# Patient Record
Sex: MALE | Race: WHITE | HISPANIC OR LATINO | Employment: STUDENT | URBAN - METROPOLITAN AREA
[De-identification: names, ages, dates, MRNs, and addresses within clinical notes are randomized per-mention and may not be internally consistent; named-entity substitution may affect disease eponyms.]

---

## 2021-09-23 ENCOUNTER — OFFICE VISIT (OUTPATIENT)
Dept: FAMILY MEDICINE CLINIC | Facility: CLINIC | Age: 21
End: 2021-09-23

## 2021-09-23 ENCOUNTER — LAB (OUTPATIENT)
Dept: LAB | Facility: HOSPITAL | Age: 21
End: 2021-09-23

## 2021-09-23 VITALS
TEMPERATURE: 96.9 F | SYSTOLIC BLOOD PRESSURE: 136 MMHG | BODY MASS INDEX: 28.36 KG/M2 | HEART RATE: 83 BPM | DIASTOLIC BLOOD PRESSURE: 68 MMHG | WEIGHT: 209.4 LBS | OXYGEN SATURATION: 96 % | RESPIRATION RATE: 16 BRPM | HEIGHT: 72 IN

## 2021-09-23 DIAGNOSIS — F41.9 ANXIETY: ICD-10-CM

## 2021-09-23 DIAGNOSIS — D72.829 LEUKOCYTOSIS, UNSPECIFIED TYPE: ICD-10-CM

## 2021-09-23 DIAGNOSIS — R07.89 OTHER CHEST PAIN: Primary | ICD-10-CM

## 2021-09-23 LAB
ALBUMIN SERPL-MCNC: 4.9 G/DL (ref 3.5–5.2)
ALBUMIN/GLOB SERPL: 1.4 G/DL
ALP SERPL-CCNC: 76 U/L (ref 39–117)
ALT SERPL W P-5'-P-CCNC: 11 U/L (ref 1–41)
ANION GAP SERPL CALCULATED.3IONS-SCNC: 10.4 MMOL/L (ref 5–15)
AST SERPL-CCNC: 20 U/L (ref 1–40)
BASOPHILS # BLD AUTO: 0.07 10*3/MM3 (ref 0–0.2)
BASOPHILS NFR BLD AUTO: 0.8 % (ref 0–1.5)
BILIRUB SERPL-MCNC: 1.3 MG/DL (ref 0–1.2)
BUN SERPL-MCNC: 11 MG/DL (ref 6–20)
BUN/CREAT SERPL: 10.4 (ref 7–25)
CALCIUM SPEC-SCNC: 10 MG/DL (ref 8.6–10.5)
CHLORIDE SERPL-SCNC: 102 MMOL/L (ref 98–107)
CO2 SERPL-SCNC: 28.6 MMOL/L (ref 22–29)
CREAT SERPL-MCNC: 1.06 MG/DL (ref 0.76–1.27)
DEPRECATED RDW RBC AUTO: 37.5 FL (ref 37–54)
EOSINOPHIL # BLD AUTO: 0.09 10*3/MM3 (ref 0–0.4)
EOSINOPHIL NFR BLD AUTO: 1 % (ref 0.3–6.2)
ERYTHROCYTE [DISTWIDTH] IN BLOOD BY AUTOMATED COUNT: 11.7 % (ref 12.3–15.4)
GFR SERPL CREATININE-BSD FRML MDRD: 89 ML/MIN/1.73
GLOBULIN UR ELPH-MCNC: 3.4 GM/DL
GLUCOSE SERPL-MCNC: 81 MG/DL (ref 65–99)
HCT VFR BLD AUTO: 45.8 % (ref 37.5–51)
HGB BLD-MCNC: 14.9 G/DL (ref 13–17.7)
IMM GRANULOCYTES # BLD AUTO: 0.03 10*3/MM3 (ref 0–0.05)
IMM GRANULOCYTES NFR BLD AUTO: 0.3 % (ref 0–0.5)
LYMPHOCYTES # BLD AUTO: 2.7 10*3/MM3 (ref 0.7–3.1)
LYMPHOCYTES NFR BLD AUTO: 30.3 % (ref 19.6–45.3)
MCH RBC QN AUTO: 28.4 PG (ref 26.6–33)
MCHC RBC AUTO-ENTMCNC: 32.5 G/DL (ref 31.5–35.7)
MCV RBC AUTO: 87.2 FL (ref 79–97)
MONOCYTES # BLD AUTO: 0.85 10*3/MM3 (ref 0.1–0.9)
MONOCYTES NFR BLD AUTO: 9.5 % (ref 5–12)
NEUTROPHILS NFR BLD AUTO: 5.17 10*3/MM3 (ref 1.7–7)
NEUTROPHILS NFR BLD AUTO: 58.1 % (ref 42.7–76)
NRBC BLD AUTO-RTO: 0 /100 WBC (ref 0–0.2)
PLATELET # BLD AUTO: 358 10*3/MM3 (ref 140–450)
PMV BLD AUTO: 10.5 FL (ref 6–12)
POTASSIUM SERPL-SCNC: 4.3 MMOL/L (ref 3.5–5.2)
PROT SERPL-MCNC: 8.3 G/DL (ref 6–8.5)
RBC # BLD AUTO: 5.25 10*6/MM3 (ref 4.14–5.8)
SODIUM SERPL-SCNC: 141 MMOL/L (ref 136–145)
WBC # BLD AUTO: 8.91 10*3/MM3 (ref 3.4–10.8)

## 2021-09-23 PROCEDURE — 99204 OFFICE O/P NEW MOD 45 MIN: CPT | Performed by: FAMILY MEDICINE

## 2021-09-23 PROCEDURE — 80053 COMPREHEN METABOLIC PANEL: CPT

## 2021-09-23 PROCEDURE — 85025 COMPLETE CBC W/AUTO DIFF WBC: CPT

## 2021-09-23 RX ORDER — ALBUTEROL SULFATE 90 UG/1
2 AEROSOL, METERED RESPIRATORY (INHALATION) DAILY PRN
COMMUNITY
Start: 2021-08-16 | End: 2022-08-16

## 2021-09-23 RX ORDER — BUSPIRONE HYDROCHLORIDE 10 MG/1
10 TABLET ORAL 3 TIMES DAILY PRN
Qty: 90 TABLET | Refills: 0 | Status: SHIPPED | OUTPATIENT
Start: 2021-09-23 | End: 2021-11-05

## 2021-09-23 RX ORDER — HYDROXYZINE PAMOATE 25 MG/1
25 CAPSULE ORAL EVERY 6 HOURS PRN
COMMUNITY
Start: 2021-09-21 | End: 2021-11-05

## 2021-09-23 RX ORDER — ESCITALOPRAM OXALATE 10 MG/1
10 TABLET ORAL DAILY
Qty: 30 TABLET | Refills: 2 | Status: SHIPPED | OUTPATIENT
Start: 2021-09-23 | End: 2021-11-05 | Stop reason: SDUPTHER

## 2021-09-23 NOTE — PROGRESS NOTES
"Subjective   Georges Lentz is a 20 y.o. male.     Chief Complaint   Patient presents with   • Establish Care   • Chest Pain     going on for 1-1/2 months       History of Present Illness     Acute complaints:  Georges Lentz is a 20 y.o. male who presents today to [facility]. He has been seen at the urgent care, and the ER over the last month for the same. On 09/02/2021, he was seen here at [facility] by MARICARMEN Núñez, for intermittent chest pain. He evidentially had a normal EKG not too long prior to that. He was noted to have a slight grade 2 diastolic heart murmur at that visit. He was subsequently seen on 09/21/2021 in the ER at  for palpitations x1 day. Troponin, and TSH were unremarkable. CMP showed a slight anion gap, and total protein elevation as well as a very mild leukocytosis to 10.97 without a left shift. A chest x-ray was unremarkable. EKG showed sinus tachycardia with possible right atrial enlargement. Computer read stated cannot rule out anteroinferior infarct. Given the duration of his pain for the last 1.5 months, this was thought to be not clinically relevant.      He reports that at the urgent care he was told he had a heart murmur. He went to the emergency room, and they did not hear a heart murmur. He reports that that day he felt like he was having palpitations, and his heart rate was going up faster when he was going up steep inclines. He also had \"weird\" leg pain as well as leg pain that radiated up to his thighs that occurs only when he is active. He believes it may have been anxiety because he had a test that day, and his chest was hurting more in the morning due to the anxiety. His heart rate, and leg pain persisted, and he felt severely fatigued. He went to the emergency room, where they administered tests, and told him he was fine.       He describes the chest pain as sharp, and just \"pops up.\" The chest pain is intermittent, and will worsen when he is anxious, but " "even occurs at rest. He reports feeling a pressure randomly in his side that would persist for 30 minutes to 1 hour, and then resolve. He adds that he has not felt this symptom for a while. He reports taking a deep breath occasionally will cause the same sharp pain in the same area. The pain would radiate down his left arm as well, but It does not stop him from doing anything. He has taken hydroxyzine, which has helped with the chest pain, and his anxiety. He adds that he takes it in the morning. He states that he was feeling some pain yesterday, but today has not felt much pain at all. He denies ever being diagnosed with anxiety, but he has had anxiety since going to school. He has seen a therapist before, and has a therapy appointment next week with  [facility].     He does not smoke cigarettes. He smokes marijuana every day, and is unsure if he will be smoking it anymore, but he was smoking marijuana at the time that the chest pain started. He lived in [place], and moved to the area at the end of 08/2021, and reports that he was having chest pain while he still lived in [place]. He has not had any testing in [place] for these symptoms. He believes the chest pain worsened at first when he moved here.       He is going to school for economics with a minor in statistics.    He reports that the anxiety stops him from doing a lot of things because he pushes things back and then they \"build up,\" and makes his anxiety worse. He adds that he paces often, and ruminates about things he is worried about. He has never been on medication for anxiety, and reports that he has felt the best he has felt in over 1 year being on the hydroxyzine.         This patient is accompanied by their self who contributes to the history of their care.    The following portions of the patient's history were reviewed and updated as appropriate: allergies, current medications, past family history, past medical history, past social " "history, past surgical history and problem list.    Active Ambulatory Problems     Diagnosis Date Noted   • No Active Ambulatory Problems     Resolved Ambulatory Problems     Diagnosis Date Noted   • No Resolved Ambulatory Problems     No Additional Past Medical History     Past Surgical History:   Procedure Laterality Date   • WISDOM TOOTH EXTRACTION       Family History   Family history unknown: Yes     Social History     Socioeconomic History   • Marital status: Single     Spouse name: Not on file   • Number of children: Not on file   • Years of education: Not on file   • Highest education level: Not on file   Tobacco Use   • Smoking status: Never Smoker   • Smokeless tobacco: Never Used   Vaping Use   • Vaping Use: Former   • Substances: THC   • Passive vaping exposure Yes   Substance and Sexual Activity   • Alcohol use: Yes     Comment: occ   • Drug use: Not Currently     Types: Marijuana   • Sexual activity: Defer       Review of Systems  See HPI and new patient paperwork scanned into chart    Objective   Blood pressure 136/68, pulse 83, temperature 96.9 °F (36.1 °C), resp. rate 16, height 182.9 cm (72\"), weight 95 kg (209 lb 6.4 oz), SpO2 96 %.  Nursing note reviewed  Physical Exam  Const: NAD, A&Ox4, Pleasant, Cooperative  Eyes: EOMI, no conjunctivitis  ENT: No nasal discharge present, neck supple  Cardiac: Regular rate and rhythm, no cyanosis  Resp: Respiratory rate within normal limits, no increased work of breathing, no audible wheezing or retractions noted  GI: No distention or ascites  MSK: Motor and sensation grossly intact in bilateral upper extremities  Neurologic: CN II-XII grossly intact  Psych: Appropriate mood and behavior.  Skin: Warm, dry  Procedures  Assessment/Plan   Problem List Items Addressed This Visit     None      Visit Diagnoses     Other chest pain    -  Primary    Anxiety        Relevant Medications    busPIRone (BUSPAR) 10 MG tablet    escitalopram (Lexapro) 10 MG tablet    " Leukocytosis, unspecified type        Relevant Orders    Comprehensive Metabolic Panel    CBC & Differential      1. Noncardiac chest pain   - His symptoms are very consistent with somatic manifestations of anxiety. He has improved with treatment already with hydroxyzine. He would benefit from a daily anxiety medication. I will send in Lexapro 10 mg daily. I counseled the patient on possible side effects of this medication as well as expectations for results. I would also like him to start taking buspirone as needed. This may replace hydroxyzine if the hydroxyzine is limited by side effects for him.     He will have blood work today, and I will call him with the results.    We will plan to obtain previous records both for chronic preventative care as well as those related to the current episode of care.  Any records that the patient brought with him today were reviewed personally by me during the visit today and will be scanned into the chart for posterity.    Discussed the nature of the disease including relevant anatomy & expected clinical course, risks, complications, implications, management, safe and proper use of medications. Encouraged therapeutic lifestyle changes including low calorie diet with plenty of fruits and vegetables, daily exercise, medication compliance, and keeping scheduled follow up appointments with me and any other providers. Encouraged patient to have appointment for complete physical, fasting labs, appropriate screenings, and immunizations on an annual basis. Discussed extended office hours, shared call, and appropriate use of the ER. Discussed generally we do not prescribe chronic controlled substances from this office. Appropriate referrals will be made to pain management and psychiatry if needed. Stressed the importance and expectation of medical compliance with plan of care, medications, and follow up appointments.    There are no Patient Instructions on file for this visit.    Return  in about 6 weeks (around 11/4/2021) for video visit.    Ambulatory progress note signed and attested to by Gustavo Duarte D.O.         Transcribed from ambient dictation for Gustavo Duarte DO by Audra Fernandes.  09/23/21   10:11 EDT    I have personally performed the services described in this document as transcribed by the above individual, and it is both accurate and complete.  Gustavo Duarte DO  9/23/2021  11:04 EDT

## 2021-11-05 ENCOUNTER — TELEMEDICINE (OUTPATIENT)
Dept: FAMILY MEDICINE CLINIC | Facility: CLINIC | Age: 21
End: 2021-11-05

## 2021-11-05 DIAGNOSIS — F41.9 ANXIETY: Primary | ICD-10-CM

## 2021-11-05 PROCEDURE — 99213 OFFICE O/P EST LOW 20 MIN: CPT | Performed by: FAMILY MEDICINE

## 2021-11-05 RX ORDER — ESCITALOPRAM OXALATE 10 MG/1
10 TABLET ORAL DAILY
Qty: 90 TABLET | Refills: 3 | Status: SHIPPED | OUTPATIENT
Start: 2021-11-05 | End: 2022-06-21 | Stop reason: SDUPTHER

## 2021-11-05 NOTE — PROGRESS NOTES
"Subjective   Georges Lentz is a 20 y.o. male.     Chief Complaint   Patient presents with   • Follow-up     anxiety       History of Present Illness     Georges Lentz presents today for   Chief Complaint   Patient presents with   • Follow-up     anxiety     He is following up on anxiety. At his initial visit he was experiencing some severe somatic manifestations of anxiety, but he states that since starting the lexapro 6 weeks ago, starting a couple weeks after that he has done extremnely well. He \"definitely doesn't want to go back to how [he] was feeling before.\" He has not experienced any side effects. He has not needed the buspirone or vistaril in about 3 weeks. He is very pleased with his response to the escitalopram so far and desires to continue the medication.    You have chosen to receive care through a telehealth visit.  Do you consent to use a video/audio connection for your medical care today? Yes    The following portions of the patient's history were reviewed and updated as appropriate: allergies, current medications, past family history, past medical history, past social history, past surgical history and problem list.    Active Ambulatory Problems     Diagnosis Date Noted   • Anxiety 11/05/2021     Resolved Ambulatory Problems     Diagnosis Date Noted   • No Resolved Ambulatory Problems     No Additional Past Medical History     Past Surgical History:   Procedure Laterality Date   • WISDOM TOOTH EXTRACTION       Family History   Family history unknown: Yes     Social History     Socioeconomic History   • Marital status: Single   Tobacco Use   • Smoking status: Never Smoker   • Smokeless tobacco: Never Used   Vaping Use   • Vaping Use: Former   • Substances: THC   • Passive vaping exposure: Yes   Substance and Sexual Activity   • Alcohol use: Yes     Comment: occ   • Drug use: Not Currently     Types: Marijuana   • Sexual activity: Defer       Review of Systems  Review of Systems -  General ROS: " negative for - chills, fever or night sweats  Cardiovascular ROS: no chest pain or dyspnea on exertion  Gastrointestinal ROS: no abdominal pain, change in bowel habits, or black or bloody stools  Genito-Urinary ROS: no dysuria, trouble voiding, or hematuria    Objective   There were no vitals taken for this visit.  Vitals obtained from patient if available  Physical Exam  Const: Non-toxic appearing, NAD, A&Ox4, Pleasant, Cooperative  Eyes: EOMI, no conjunctivitis  ENT: No copious nasal drainage noted  Cardiac: Regular rate by pulse  Resp: Respiratory rate observed to be within normal limits, no increased work of breathing observed, no audible wheezing or cough noted  Psych: Appropriate mood and behavior.  Procedures  Assessment/Plan   Problem List Items Addressed This Visit        Mental Health    Anxiety - Primary    Current Assessment & Plan     Responded extremely well to escitalopram 10mg daily. All of his symptoms particularly the somatic manifestations of his anxiety (chest pain, etc) are gone. He is pleased with the med and would like to continue. Refill sent today.         Relevant Medications    escitalopram (Lexapro) 10 MG tablet          See patient diagnoses and orders along with patient instructions for assessment, plan, and changes to care for patient.    This visit was conducted via telemedicine with live video and audio provided through Video Options: MyChart/Zoom at the point of care.    Patient Instructions   1.  Continue lexapro 10mg daily      Return in about 1 year (around 11/5/2022) for Annual.    Ambulatory progress note signed and attested to by Gustavo Duarte D.O.

## 2021-11-05 NOTE — ASSESSMENT & PLAN NOTE
Responded extremely well to escitalopram 10mg daily. All of his symptoms particularly the somatic manifestations of his anxiety (chest pain, etc) are gone. He is pleased with the med and would like to continue. Refill sent today.

## 2022-06-21 DIAGNOSIS — F41.9 ANXIETY: ICD-10-CM

## 2022-06-22 NOTE — TELEPHONE ENCOUNTER
Rx Refill Note  Requested Prescriptions     Pending Prescriptions Disp Refills   • escitalopram (Lexapro) 10 MG tablet 90 tablet 3     Sig: Take 1 tablet by mouth Daily.      Last office visit with prescribing clinician: 11/5/2021      Next office visit with prescribing clinician: Visit date not found            Richard Lee MA  06/22/22, 13:27 EDT     Patient requesting refill to [facility] in [place], [place] need to see if he is visiting or has moved there.    OK FOR HUB TO RELAY MESSAGE AND DOCUMENT

## 2022-06-23 RX ORDER — ESCITALOPRAM OXALATE 10 MG/1
10 TABLET ORAL DAILY
Qty: 90 TABLET | Refills: 1 | Status: SHIPPED | OUTPATIENT
Start: 2022-06-23 | End: 2022-07-21 | Stop reason: SDUPTHER

## 2022-07-21 DIAGNOSIS — F41.9 ANXIETY: ICD-10-CM

## 2022-07-21 RX ORDER — ESCITALOPRAM OXALATE 10 MG/1
10 TABLET ORAL DAILY
Qty: 90 TABLET | Refills: 1 | Status: SHIPPED | OUTPATIENT
Start: 2022-07-21 | End: 2023-03-08 | Stop reason: SDUPTHER

## 2022-07-21 NOTE — TELEPHONE ENCOUNTER
Rx Refill Note  Requested Prescriptions     Pending Prescriptions Disp Refills   • escitalopram (Lexapro) 10 MG tablet 90 tablet 1     Sig: Take 1 tablet by mouth Daily.      Last office visit with prescribing clinician: 9/23/2021      Next office visit with prescribing clinician: Visit date not found            Ashley Magallon MA  07/21/22, 16:47 EDT

## 2023-03-08 ENCOUNTER — LAB (OUTPATIENT)
Dept: LAB | Facility: HOSPITAL | Age: 23
End: 2023-03-08

## 2023-03-08 ENCOUNTER — OFFICE VISIT (OUTPATIENT)
Dept: FAMILY MEDICINE CLINIC | Facility: CLINIC | Age: 23
End: 2023-03-08

## 2023-03-08 VITALS
WEIGHT: 285 LBS | HEIGHT: 72 IN | SYSTOLIC BLOOD PRESSURE: 130 MMHG | HEART RATE: 76 BPM | DIASTOLIC BLOOD PRESSURE: 70 MMHG | BODY MASS INDEX: 38.6 KG/M2 | OXYGEN SATURATION: 98 %

## 2023-03-08 DIAGNOSIS — Z13.220 SCREENING FOR HYPERLIPIDEMIA: ICD-10-CM

## 2023-03-08 DIAGNOSIS — E66.09 CLASS 2 OBESITY DUE TO EXCESS CALORIES WITHOUT SERIOUS COMORBIDITY WITH BODY MASS INDEX (BMI) OF 38.0 TO 38.9 IN ADULT: ICD-10-CM

## 2023-03-08 DIAGNOSIS — Z13.29 SCREENING FOR ENDOCRINE DISORDER: ICD-10-CM

## 2023-03-08 DIAGNOSIS — F41.9 ANXIETY: Primary | ICD-10-CM

## 2023-03-08 LAB
ALBUMIN SERPL-MCNC: 4.6 G/DL (ref 3.5–5.2)
ALBUMIN/GLOB SERPL: 1.4 G/DL
ALP SERPL-CCNC: 104 U/L (ref 39–117)
ALT SERPL W P-5'-P-CCNC: 46 U/L (ref 1–41)
ANION GAP SERPL CALCULATED.3IONS-SCNC: 10.6 MMOL/L (ref 5–15)
AST SERPL-CCNC: 29 U/L (ref 1–40)
BILIRUB SERPL-MCNC: 0.5 MG/DL (ref 0–1.2)
BUN SERPL-MCNC: 15 MG/DL (ref 6–20)
BUN/CREAT SERPL: 16.1 (ref 7–25)
CALCIUM SPEC-SCNC: 9.5 MG/DL (ref 8.6–10.5)
CHLORIDE SERPL-SCNC: 102 MMOL/L (ref 98–107)
CHOLEST SERPL-MCNC: 194 MG/DL (ref 0–200)
CO2 SERPL-SCNC: 24.4 MMOL/L (ref 22–29)
CREAT SERPL-MCNC: 0.93 MG/DL (ref 0.76–1.27)
EGFRCR SERPLBLD CKD-EPI 2021: 119.1 ML/MIN/1.73
GLOBULIN UR ELPH-MCNC: 3.2 GM/DL
GLUCOSE SERPL-MCNC: 93 MG/DL (ref 65–99)
HBA1C MFR BLD: 4.8 % (ref 4.8–5.6)
HDLC SERPL-MCNC: 53 MG/DL (ref 40–60)
LDLC SERPL CALC-MCNC: 123 MG/DL (ref 0–100)
LDLC/HDLC SERPL: 2.28 {RATIO}
POTASSIUM SERPL-SCNC: 4.3 MMOL/L (ref 3.5–5.2)
PROT SERPL-MCNC: 7.8 G/DL (ref 6–8.5)
SODIUM SERPL-SCNC: 137 MMOL/L (ref 136–145)
TRIGL SERPL-MCNC: 100 MG/DL (ref 0–150)
TSH SERPL DL<=0.05 MIU/L-ACNC: 3.48 UIU/ML (ref 0.27–4.2)
VLDLC SERPL-MCNC: 18 MG/DL (ref 5–40)

## 2023-03-08 PROCEDURE — 80053 COMPREHEN METABOLIC PANEL: CPT | Performed by: FAMILY MEDICINE

## 2023-03-08 PROCEDURE — 99213 OFFICE O/P EST LOW 20 MIN: CPT | Performed by: FAMILY MEDICINE

## 2023-03-08 PROCEDURE — 84443 ASSAY THYROID STIM HORMONE: CPT | Performed by: FAMILY MEDICINE

## 2023-03-08 PROCEDURE — 83036 HEMOGLOBIN GLYCOSYLATED A1C: CPT | Performed by: FAMILY MEDICINE

## 2023-03-08 PROCEDURE — 80061 LIPID PANEL: CPT | Performed by: FAMILY MEDICINE

## 2023-03-08 RX ORDER — ESCITALOPRAM OXALATE 10 MG/1
10 TABLET ORAL DAILY
Qty: 90 TABLET | Refills: 3 | Status: SHIPPED | OUTPATIENT
Start: 2023-03-08

## 2023-03-08 NOTE — ASSESSMENT & PLAN NOTE
He responded extremely well to escitalopram over the last year and a half.  His anxiety symptoms, in particular the somatic manifestations of his anxiety, have resolved entirely since being on this medication.

## 2023-03-08 NOTE — PATIENT INSTRUCTIONS
Blood work has been ordered for you today. You do not need an appointment.  If you are unable to have your labs done today, please return over the next few days to have them done.  The nurse will call you with results (or they will become available via [employer]) or they will be discussed at your next scheduled visit.  -Lab services are available at any [facility] outpatient lab center, including across the street at [address] [place] [address]  -Note: As of January 1, 2021, all lab results are automatically released to [employer] immediately when they return whether they have been reviewed or not. You may see your results show up on your MyChart without any context or comment. Please allow 1-2 business days after the labs have returned for them to be reviewed.

## 2023-03-08 NOTE — PROGRESS NOTES
Established Patient Office Visit      Patient Name: Georges Lentz  : 2000   MRN: [id number]   Care Team: Patient Care Team:  Gustavo Duarte DO as PCP - General (Family Medicine)    Chief Complaint:    Chief Complaint   Patient presents with   • Anxiety     Med refill       History of Present Illness: Georges Lentz is a 22 y.o. male who is here today for chief complaint.    HPI    Georges presents today for medication refill for his anxiety.  He is on escitalopram 10 mg daily.  His last visit with me was a telehealth visit in 2021.  He initially had established care in September of that year.  Leading up to that he had been seen at the urgent care in the ER for chest pain and palpitations, deemed to be anxiety mediated more than anything cardiac.    He reports today that the Lexapro is still doing extremely well.  He has not had any return of his previous anxiety symptoms.  Denies any side effects of the medication.  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    This patient is accompanied by their self who contributes to the history of their care.    The following portions of the patient's history were reviewed and updated as appropriate: allergies, current medications, past family history, past medical history, past social history, past surgical history and problem list.    Subjective      Review of Systems:   Review of Systems - See HPI    Past Medical History: History reviewed. No pertinent past medical history.    Past Surgical History:   Past Surgical History:   Procedure Laterality Date   • WISDOM TOOTH EXTRACTION         Family History:   Family History   Family history unknown: Yes       Social History:   Social History     Socioeconomic History   • Marital status: Single   Tobacco Use   • Smoking status: Never   • Smokeless tobacco: Never   Vaping Use   • Vaping Use: Former   • Substances: THC   • Passive vaping exposure:  "Yes   Substance and Sexual Activity   • Alcohol use: Yes     Comment: occ   • Drug use: Not Currently     Types: Marijuana   • Sexual activity: Defer       Tobacco History:   Social History     Tobacco Use   Smoking Status Never   Smokeless Tobacco Never       Medications:     Current Outpatient Medications:   •  escitalopram (Lexapro) 10 MG tablet, Take 1 tablet by mouth Daily., Disp: 90 tablet, Rfl: 3    Allergies:   No Known Allergies    Objective   Objective     Physical Exam:  Vital Signs:   Vitals:    03/08/23 0816   BP: 130/70   Pulse: 76   SpO2: 98%   Weight: 129 kg (285 lb)   Height: 182.9 cm (72\")     Body mass index is 38.65 kg/m².     Physical Exam  Nursing note reviewed  Const: NAD, A&Ox4, Pleasant, Cooperative  Eyes: EOMI, no conjunctivitis  ENT: No nasal discharge present, neck supple  Cardiac: Regular rate and rhythm, no cyanosis  Resp: Respiratory rate within normal limits, no increased work of breathing, no audible wheezing or retractions noted  GI: No distention or ascites  MSK: Motor and sensation grossly intact in bilateral upper extremities  Neurologic: CN II-XII grossly intact  Psych: Appropriate mood and behavior.  Skin: Warm, dry  Procedures/Radiology     Procedures  No radiology results for the last 7 days     Assessment & Plan   Assessment / Plan      Assessment/Plan:   Problems Addressed This Visit  Diagnoses and all orders for this visit:    1. Anxiety (Primary)  Assessment & Plan:  He responded extremely well to escitalopram over the last year and a half.  His anxiety symptoms, in particular the somatic manifestations of his anxiety, have resolved entirely since being on this medication.    Orders:  -     escitalopram (Lexapro) 10 MG tablet; Take 1 tablet by mouth Daily.  Dispense: 90 tablet; Refill: 3    2. Screening for endocrine disorder  -     Comprehensive Metabolic Panel; Future  -     Hemoglobin A1c; Future  -     TSH; Future    3. Screening for hyperlipidemia  -     Lipid Panel; " Future    4. Class 2 obesity due to excess calories without serious comorbidity with body mass index (BMI) of 38.0 to 38.9 in adult    Problem List Items Addressed This Visit        Mental Health    Anxiety - Primary    Overview     Escitalopram 10 mg daily started September 2021         Current Assessment & Plan     He responded extremely well to escitalopram over the last year and a half.  His anxiety symptoms, in particular the somatic manifestations of his anxiety, have resolved entirely since being on this medication.         Relevant Medications    escitalopram (Lexapro) 10 MG tablet   Other Visit Diagnoses     Screening for endocrine disorder        Relevant Orders    Comprehensive Metabolic Panel    Hemoglobin A1c    TSH    Screening for hyperlipidemia        Relevant Orders    Lipid Panel    Class 2 obesity due to excess calories without serious comorbidity with body mass index (BMI) of 38.0 to 38.9 in adult                  Patient Instructions   Blood work has been ordered for you today. You do not need an appointment.  If you are unable to have your labs done today, please return over the next few days to have them done.  The nurse will call you with results (or they will become available via [employer]) or they will be discussed at your next scheduled visit.  -Lab services are available at any [facility] outpatient lab center, including across the street at [address] [place] [address]  -Note: As of January 1, 2021, all lab results are automatically released to [employer] immediately when they return whether they have been reviewed or not. You may see your results show up on your [employer] without any context or comment. Please allow 1-2 business days after the labs have returned for them to be reviewed.         Follow Up:   Return in about 1 year (around 3/8/2024) for Annual.    DO RONY Schulz RD  [facility]  [address]  [facility]  [phone]  Fax [phone]  Phone [phone]